# Patient Record
Sex: FEMALE | Race: WHITE | Employment: OTHER | URBAN - NONMETROPOLITAN AREA
[De-identification: names, ages, dates, MRNs, and addresses within clinical notes are randomized per-mention and may not be internally consistent; named-entity substitution may affect disease eponyms.]

---

## 2020-03-19 ENCOUNTER — APPOINTMENT (OUTPATIENT)
Dept: GENERAL RADIOLOGY | Age: 80
End: 2020-03-19
Payer: MEDICARE

## 2020-03-19 ENCOUNTER — HOSPITAL ENCOUNTER (EMERGENCY)
Age: 80
Discharge: HOME | End: 2020-03-19
Payer: MEDICARE

## 2020-03-19 VITALS
BODY MASS INDEX: 28.45 KG/M2 | RESPIRATION RATE: 18 BRPM | DIASTOLIC BLOOD PRESSURE: 75 MMHG | SYSTOLIC BLOOD PRESSURE: 153 MMHG | HEART RATE: 78 BPM | OXYGEN SATURATION: 100 % | WEIGHT: 177 LBS | TEMPERATURE: 98.1 F | HEIGHT: 66 IN

## 2020-03-19 LAB
ANION GAP SERPL CALCULATED.3IONS-SCNC: 13 MEQ/L (ref 8–16)
BASOPHILS # BLD: 0.3 %
BASOPHILS ABSOLUTE: 0 THOU/MM3 (ref 0–0.1)
BUN BLDV-MCNC: 22 MG/DL (ref 7–22)
CALCIUM SERPL-MCNC: 10.3 MG/DL (ref 8.5–10.5)
CHLORIDE BLD-SCNC: 99 MEQ/L (ref 98–111)
CO2: 26 MEQ/L (ref 23–33)
CREAT SERPL-MCNC: 0.8 MG/DL (ref 0.4–1.2)
EKG ATRIAL RATE: 98 BPM
EKG P AXIS: 69 DEGREES
EKG P-R INTERVAL: 148 MS
EKG Q-T INTERVAL: 326 MS
EKG QRS DURATION: 78 MS
EKG QTC CALCULATION (BAZETT): 416 MS
EKG R AXIS: 35 DEGREES
EKG T AXIS: 66 DEGREES
EKG VENTRICULAR RATE: 98 BPM
EOSINOPHIL # BLD: 2.3 %
EOSINOPHILS ABSOLUTE: 0.2 THOU/MM3 (ref 0–0.4)
ERYTHROCYTE [DISTWIDTH] IN BLOOD BY AUTOMATED COUNT: 13.2 % (ref 11.5–14.5)
ERYTHROCYTE [DISTWIDTH] IN BLOOD BY AUTOMATED COUNT: 45.3 FL (ref 35–45)
FLU A ANTIGEN: NEGATIVE
FLU B ANTIGEN: NEGATIVE
GFR SERPL CREATININE-BSD FRML MDRD: 69 ML/MIN/1.73M2
GLUCOSE BLD-MCNC: 97 MG/DL (ref 70–108)
GROUP A STREP CULTURE, REFLEX: NEGATIVE
HCT VFR BLD CALC: 41.7 % (ref 37–47)
HEMOGLOBIN: 13.2 GM/DL (ref 12–16)
IMMATURE GRANS (ABS): 0.02 THOU/MM3 (ref 0–0.07)
IMMATURE GRANULOCYTES: 0.2 %
LYMPHOCYTES # BLD: 19.5 %
LYMPHOCYTES ABSOLUTE: 1.8 THOU/MM3 (ref 1–4.8)
MCH RBC QN AUTO: 30 PG (ref 26–33)
MCHC RBC AUTO-ENTMCNC: 31.7 GM/DL (ref 32.2–35.5)
MCV RBC AUTO: 94.8 FL (ref 81–99)
MONOCYTES # BLD: 4.9 %
MONOCYTES ABSOLUTE: 0.4 THOU/MM3 (ref 0.4–1.3)
NUCLEATED RED BLOOD CELLS: 0 /100 WBC
OSMOLALITY CALCULATION: 278.9 MOSMOL/KG (ref 275–300)
PLATELET # BLD: 285 THOU/MM3 (ref 130–400)
PMV BLD AUTO: 9.9 FL (ref 9.4–12.4)
POTASSIUM SERPL-SCNC: 4.2 MEQ/L (ref 3.5–5.2)
RBC # BLD: 4.4 MILL/MM3 (ref 4.2–5.4)
REFLEX THROAT C + S: NORMAL
SEG NEUTROPHILS: 72.8 %
SEGMENTED NEUTROPHILS ABSOLUTE COUNT: 6.6 THOU/MM3 (ref 1.8–7.7)
SODIUM BLD-SCNC: 138 MEQ/L (ref 135–145)
TROPONIN T: < 0.01 NG/ML
WBC # BLD: 9.1 THOU/MM3 (ref 4.8–10.8)

## 2020-03-19 PROCEDURE — 93010 ELECTROCARDIOGRAM REPORT: CPT | Performed by: INTERNAL MEDICINE

## 2020-03-19 PROCEDURE — 80048 BASIC METABOLIC PNL TOTAL CA: CPT

## 2020-03-19 PROCEDURE — 93005 ELECTROCARDIOGRAM TRACING: CPT | Performed by: EMERGENCY MEDICINE

## 2020-03-19 PROCEDURE — 87804 INFLUENZA ASSAY W/OPTIC: CPT

## 2020-03-19 PROCEDURE — 85025 COMPLETE CBC W/AUTO DIFF WBC: CPT

## 2020-03-19 PROCEDURE — 87880 STREP A ASSAY W/OPTIC: CPT

## 2020-03-19 PROCEDURE — 36415 COLL VENOUS BLD VENIPUNCTURE: CPT

## 2020-03-19 PROCEDURE — 87070 CULTURE OTHR SPECIMN AEROBIC: CPT

## 2020-03-19 PROCEDURE — 99282 EMERGENCY DEPT VISIT SF MDM: CPT

## 2020-03-19 PROCEDURE — 71046 X-RAY EXAM CHEST 2 VIEWS: CPT

## 2020-03-19 PROCEDURE — 99283 EMERGENCY DEPT VISIT LOW MDM: CPT

## 2020-03-19 PROCEDURE — 84484 ASSAY OF TROPONIN QUANT: CPT

## 2020-03-19 RX ORDER — LISINOPRIL 20 MG/1
20 TABLET ORAL DAILY
COMMUNITY

## 2020-03-19 RX ORDER — TRIAMTERENE AND HYDROCHLOROTHIAZIDE 37.5; 25 MG/1; MG/1
1 TABLET ORAL DAILY
COMMUNITY

## 2020-03-19 RX ORDER — ROSUVASTATIN CALCIUM 10 MG/1
10 TABLET, COATED ORAL DAILY
COMMUNITY

## 2020-03-19 ASSESSMENT — ENCOUNTER SYMPTOMS
SHORTNESS OF BREATH: 1
COUGH: 1
RHINORRHEA: 0
VOMITING: 0
ABDOMINAL PAIN: 0
NAUSEA: 0
SORE THROAT: 1

## 2020-03-19 NOTE — ED NOTES
Pt resting in bed upon entering room requesting to use the bathroom. Pt provided with stand by assistance and returned to bed without incident. Upon returning to room pt appeared to be sob and spo2 initially was 88% however increased to 97% on room air. Pt waiting to discuss the POC with provider. Call light remains in reach will continue to monitor and update on the POC.      Kathy Corrales RN  03/19/20 3731

## 2020-03-19 NOTE — ED PROVIDER NOTES
[address] [facility] [address] [address] EMERGENCY DEPT    EMERGENCY MEDICINE     Pt Name: Silvestre Justice  MRN: [id number]  [place] 1940  Date of evaluation: 3/19/2020  Provider: Chirag Dunlap [facility]       Chief Complaint   Patient presents with    Cough    Shortness of Breath       HISTORY OF PRESENT ILLNESS    Silvestre Justice is a 78 y.o. female who presents to the emergency department from home with a past medical history of HTN and HLD. Patient states that three days ago she developed a nonproductive cough. Yesterday the patient was out running errands when she noticed worsened chest congestion. Today she reports shortness of breath but no cough. Patient went to an Urgent Care where she was told she had diminished breath sounds and sent to the ED. Patient rates her current symptoms as a 5/10 in severity. She notes sore throat, decreased hearing, and a headache in the frontal region. Patient has been using an albuterol inhaler for her symptoms that she usually uses when walking, with no relief. Patient repeatedly states that this is not a cardiac issue as she has experienced this previously and has had cardiac workups for this. Patient states that she sought evaluation today due to concerns from her daughter. Patient denies fever, chest pain, new rhinorrhea, or abdominal pain. No further complaints at initial encounter. Triage notes and Nursing notes were reviewed by myself. Any discrepancies are addressed above. PAST MEDICAL HISTORY     Past Medical History:   Diagnosis Date    Hyperlipidemia     Hypertension        SURGICAL HISTORY     History reviewed. No pertinent surgical history.     CURRENT MEDICATIONS       Discharge Medication List as of 3/19/2020  3:30 PM      CONTINUE these medications which have NOT CHANGED    Details   lisinopril (PRINIVIL;ZESTRIL) 20 MG tablet Take 20 mg by mouth dailyHistorical Med      rosuvastatin (CRESTOR) 10 MG tablet Take 10 mg by mouth daily Pt reports 6 tabs a monthHistorical Med      triamterene-hydrochlorothiazide (MAXZIDE-25) 37.5-25 MG per tablet Take 1 tablet by mouth dailyHistorical Med             ALLERGIES     Patient has no known allergies. FAMILY HISTORY     History reviewed. No pertinent family history. SOCIAL HISTORY       Social History     Socioeconomic History    Marital status: Single     Spouse name: None    Number of children: None    Years of education: None    Highest education level: None   Occupational History    None   Social Needs    Financial resource strain: None    Food insecurity     Worry: None     Inability: None    Transportation needs     Medical: None     Non-medical: None   Tobacco Use    Smoking status: Never Smoker    Smokeless tobacco: Never Used   Substance and Sexual Activity    Alcohol use: Yes    Drug use: Never    Sexual activity: None   Lifestyle    Physical activity     Days per week: None     Minutes per session: None    Stress: None   Relationships    Social connections     Talks on phone: None     Gets together: None     Attends Yazdanism service: None     Active member of club or organization: None     Attends meetings of clubs or organizations: None     Relationship status: None    Intimate partner violence     Fear of current or ex partner: None     Emotionally abused: None     Physically abused: None     Forced sexual activity: None   Other Topics Concern    None   Social History Narrative    None       REVIEW OF SYSTEMS     Review of Systems   Constitutional: Negative for fever. HENT: Positive for congestion, hearing loss (muffled) and sore throat. Negative for rhinorrhea. Respiratory: Positive for cough and shortness of breath. Cardiovascular: Negative for chest pain. Gastrointestinal: Negative for abdominal pain, nausea and vomiting. Musculoskeletal: Negative for arthralgias. Neurological: Positive for headaches.        Except as noted above the remainder of the review of systems No acute findings. **This report has been created using voice recognition software. It may contain minor errors which are inherent in voice recognition technology. **      Final report electronically signed by Dr. Eloisa Vásquez on 3/19/2020 2:25 PM          LABS:  Labs Reviewed   CBC WITH AUTO DIFFERENTIAL - Abnormal; Notable for the following components:       Result Value    MCHC 31.7 (*)     RDW-SD 45.3 (*)     All other components within normal limits   GLOMERULAR FILTRATION RATE, ESTIMATED - Abnormal; Notable for the following components:    Est, Glom Filt Rate 69 (*)     All other components within normal limits   RAPID INFLUENZA A/B ANTIGENS   CULTURE, THROAT    Narrative:     Source: Specimen not received       Site:           Current Antibiotics:   BASIC METABOLIC PANEL   TROPONIN   ANION GAP   OSMOLALITY   GROUP A STREP, REFLEX       All other labs were within normal range or not returned as of this dictation. Please note, any cultures that may have been sent were not resulted at the time of this patient visit. EMERGENCY DEPARTMENT COURSE andMedical Decision Making:     MDM/   This is a 28-year-old female with a past medical history of hyperlipidemia and hypertension who presented to the ED today with a gradual onset of cough, congestion, and shortness of breath. Patient was seen in urgent care and recommended to come to the ER due to the nature of her symptoms and concern for possible cardiopulmonary involvement. In the ED we ran an EKG which demonstrated no STEMI, and STEMI or notable abnormality. Patient's initial troponin was negative. A chest x-ray was also obtained which showed no abnormalities. Based on the patient's history, and physical exam, I do not believe this to be ACS, pneumonia, COVID-19, or pneumothorax. Labs were otherwise all within normal limits.  It is likely the patient is suffering from a viral URI that is contributing to her congestion, occasional cough, and mild

## 2020-03-19 NOTE — ED NOTES
Upon entering room for reassessment pt had removed herself from the monitor and was no longer present.      Luisana Mcfarlane RN  03/19/20 2225

## 2020-03-19 NOTE — ED TRIAGE NOTES
Pt to ed concerned with sob and cough that has progressed over the last 72 hours. Pt placed on the cardiac monitor and ekg obtained. Pt reports that prior to arrival she used her albuterol inhaler without relief. Attempted to start protocol orders and pt refuses until she is seen per provider. Lung sounds clear, regular and unlabored. Pt reports labored breathing with movement. Bowel sounds active x4 and pt denies tenderness. Call light inr each with sr up x2 will monitor for further orders.

## 2020-03-24 LAB — THROAT/NOSE CULTURE: NORMAL

## 2021-02-24 ENCOUNTER — OFFICE VISIT (OUTPATIENT)
Dept: FAMILY MEDICINE CLINIC | Age: 81
End: 2021-02-24
Payer: MEDICARE

## 2021-02-24 VITALS
RESPIRATION RATE: 18 BRPM | TEMPERATURE: 97.3 F | DIASTOLIC BLOOD PRESSURE: 72 MMHG | SYSTOLIC BLOOD PRESSURE: 132 MMHG | OXYGEN SATURATION: 98 % | HEART RATE: 72 BPM

## 2021-02-24 DIAGNOSIS — R33.9 URINARY RETENTION: ICD-10-CM

## 2021-02-24 DIAGNOSIS — R10.30 LOWER ABDOMINAL PAIN: ICD-10-CM

## 2021-02-24 DIAGNOSIS — R35.0 URINARY FREQUENCY: Primary | ICD-10-CM

## 2021-02-24 LAB
BILIRUBIN URINE: NEGATIVE
BLOOD URINE, POC: NEGATIVE
CHARACTER, URINE: CLEAR
COLOR, URINE: ABNORMAL
GLUCOSE URINE: NEGATIVE MG/DL
KETONES, URINE: NEGATIVE
LEUKOCYTE CLUMPS, URINE: NEGATIVE
NITRITE, URINE: NEGATIVE
PH, URINE: 5.5 (ref 5–9)
PROTEIN, URINE: NEGATIVE MG/DL
SPECIFIC GRAVITY, URINE: 1.02 (ref 1–1.03)
UROBILINOGEN, URINE: 0.2 EU/DL (ref 0–1)

## 2021-02-24 PROCEDURE — G8484 FLU IMMUNIZE NO ADMIN: HCPCS | Performed by: NURSE PRACTITIONER

## 2021-02-24 PROCEDURE — 1036F TOBACCO NON-USER: CPT | Performed by: NURSE PRACTITIONER

## 2021-02-24 PROCEDURE — 1123F ACP DISCUSS/DSCN MKR DOCD: CPT | Performed by: NURSE PRACTITIONER

## 2021-02-24 PROCEDURE — G8417 CALC BMI ABV UP PARAM F/U: HCPCS | Performed by: NURSE PRACTITIONER

## 2021-02-24 PROCEDURE — G8400 PT W/DXA NO RESULTS DOC: HCPCS | Performed by: NURSE PRACTITIONER

## 2021-02-24 PROCEDURE — G8427 DOCREV CUR MEDS BY ELIG CLIN: HCPCS | Performed by: NURSE PRACTITIONER

## 2021-02-24 PROCEDURE — 99203 OFFICE O/P NEW LOW 30 MIN: CPT | Performed by: NURSE PRACTITIONER

## 2021-02-24 PROCEDURE — 1090F PRES/ABSN URINE INCON ASSESS: CPT | Performed by: NURSE PRACTITIONER

## 2021-02-24 PROCEDURE — 4040F PNEUMOC VAC/ADMIN/RCVD: CPT | Performed by: NURSE PRACTITIONER

## 2021-02-24 RX ORDER — CIPROFLOXACIN 500 MG/1
500 TABLET, FILM COATED ORAL 2 TIMES DAILY
Qty: 6 TABLET | Refills: 0 | Status: SHIPPED | OUTPATIENT
Start: 2021-02-24 | End: 2021-02-27

## 2021-02-24 RX ORDER — PHENAZOPYRIDINE HYDROCHLORIDE 100 MG/1
100 TABLET, FILM COATED ORAL 3 TIMES DAILY PRN
Qty: 9 TABLET | Refills: 0 | Status: SHIPPED | OUTPATIENT
Start: 2021-02-24 | End: 2021-02-27

## 2021-02-24 ASSESSMENT — PATIENT HEALTH QUESTIONNAIRE - PHQ9
SUM OF ALL RESPONSES TO PHQ QUESTIONS 1-9: 0
2. FEELING DOWN, DEPRESSED OR HOPELESS: 0
1. LITTLE INTEREST OR PLEASURE IN DOING THINGS: 0

## 2021-02-24 NOTE — PATIENT INSTRUCTIONS
Patient Education        Urinary Retention: Care Instructions  Your Care Instructions     Urinary retention means that you aren't able to urinate. In men, it is often caused by a blockage of the urinary tract from an enlarged prostate gland. In men and women, it can also be caused by an infection or nerve damage. Or it may be a side effect of a medicine. The doctor may have drained the urine from your bladder. If you still have problems passing urine, you may need to use a catheter at home. This is used to empty your bladder until the problem can be fixed. Your doctor may put a catheter in your bladder before you go home. If so, he or she will tell you when to come back to have the catheter removed. The doctor has checked you closely. But problems can develop later. If you notice any problems or new symptoms, get medical treatment right away. Follow-up care is a key part of your treatment and safety. Be sure to make and go to all appointments, and call your doctor if you are having problems. It's also a good idea to know your test results and keep a list of the medicines you take. How can you care for yourself at home? · Take your medicines exactly as prescribed. Call your doctor if you think you are having a problem with your medicine. You will get more details on the specific medicines your doctor prescribes. · Check with your doctor before you use any over-the-counter medicines. Many cold and allergy medicines, for example, can make this problem worse. Make sure your doctor knows all of the medicines, vitamins, supplements, and herbal remedies you take. · Spread out through the day the amount of fluid you drink. Do not drink a lot at bedtime. · Avoid alcohol and caffeine. · If you have been given a catheter, or if one is already in place, follow the instructions you were given. Always wash your hands before and after you handle the catheter. When should you call for help? Call your doctor now or seek immediate medical care if:    · You cannot urinate at all, or it is getting harder to urinate.     · You have symptoms of a urinary tract infection. These may include:  ? Pain or burning when you urinate. ? A frequent need to urinate without being able to pass much urine. ? Pain in the flank, which is just below the rib cage and above the waist on either side of the back. ? Blood in your urine. ? A fever. Watch closely for changes in your health, and be sure to contact your doctor if:    · You have any problems with your catheter.     · You do not get better as expected. Where can you learn more? Go to https://QFO LabspeBitMethod.Ogone. org and sign in to your [employer] account. Enter M244 in the Metastorm box to learn more about \"Urinary Retention: Care Instructions. \"     If you do not have an account, please click on the \"Sign Up Now\" link. Current as of: June 29, 2020               Content Version: 12.6  © 2006-2020 [employer]. Care instructions adapted under license by [facility] ([facility]). If you have questions about a medical condition or this instruction, always ask your healthcare professional. [place] [address] any warranty or liability for your use of this information. Patient Education        Urinary Tract Infection in Women: Care Instructions  Your Care Instructions     A urinary tract infection, or UTI, is a general term for an infection anywhere between the kidneys and the urethra (where urine comes out). Most UTIs are bladder infections. They often cause pain or burning when you urinate. UTIs are caused by bacteria and can be cured with antibiotics. Be sure to complete your treatment so that the infection goes away. Follow-up care is a key part of your treatment and safety. Be sure to make and go to all appointments, and call your doctor if you are having problems. It's also a good idea to know your test results and keep a list of the medicines you take. How can you care for yourself at home? · Take your antibiotics as directed. Do not stop taking them just because you feel better. You need to take the full course of antibiotics. · Drink extra water and other fluids for the next day or two. This may help wash out the bacteria that are causing the infection. (If you have kidney, heart, or liver disease and have to limit fluids, talk with your doctor before you increase your fluid intake.)  · Avoid drinks that are carbonated or have caffeine. They can irritate the bladder. · Urinate often. Try to empty your bladder each time. · To relieve pain, take a hot bath or lay a heating pad set on low over your lower belly or genital area. Never go to sleep with a heating pad in place. To prevent UTIs  · Drink plenty of water each day. This helps you urinate often, which clears bacteria from your system. (If you have kidney, heart, or liver disease and have to limit fluids, talk with your doctor before you increase your fluid intake.)  · Urinate when you need to. · Urinate right after you have sex. · Change sanitary pads often. · Avoid douches, bubble baths, feminine hygiene sprays, and other feminine hygiene products that have deodorants. · After going to the bathroom, wipe from front to back. When should you call for help? Call your doctor now or seek immediate medical care if:    · Symptoms such as fever, chills, nausea, or vomiting get worse or appear for the first time.     · You have new pain in your back just below your rib cage. This is called flank pain.     · There is new blood or pus in your urine.     · You have any problems with your antibiotic medicine. Watch closely for changes in your health, and be sure to contact your doctor if:    · You are not getting better after taking an antibiotic for 2 days.     · Your symptoms go away but then come back. Where can you learn more? Go to https://chpelashandaeb.healthQuincy Bioscience. org and sign in to your [employer] account. Enter Y256 in the Nimbula box to learn more about \"Urinary Tract Infection in Women: Care Instructions. \"     If you do not have an account, please click on the \"Sign Up Now\" link. Current as of: June 29, 2020               Content Version: 12.6  © 8046-2416 [employer]. Care instructions adapted under license by [facility] ([facility]). If you have questions about a medical condition or this instruction, always ask your healthcare professional. [address] any warranty or liability for your use of this information.

## 2021-02-24 NOTE — PROGRESS NOTES
Ervin Benito (:  1940) is a [de-identified] y.o. female,New patient, here for evaluation of the following chief complaint(s):  Urinary Retention (ongoing off and on at least 1 week) and Abdominal Cramping (started last night)      ASSESSMENT/PLAN:  1. Urinary frequency  Pt non toxic appearing and in no acute distress. UA in office unremarkable, but with hx of UTI's and her current symptoms will treat presumptively, sending urine for culture also. -     ciprofloxacin (CIPRO) 500 MG tablet; Take 1 tablet by mouth 2 times daily for 3 days, Disp-6 tablet, R-0Normal  -     phenazopyridine (PYRIDIUM) 100 MG tablet; Take 1 tablet by mouth 3 times daily as needed for Pain, Disp-9 tablet, R-0Normal  2. Urinary retention  -     Culture, Urine  3. Lower abdominal pain  -     Culture, Urine  -     ciprofloxacin (CIPRO) 500 MG tablet; Take 1 tablet by mouth 2 times daily for 3 days, Disp-6 tablet, R-0Normal  -     phenazopyridine (PYRIDIUM) 100 MG tablet; Take 1 tablet by mouth 3 times daily as needed for Pain, Disp-9 tablet, R-0Normal      Return in about 1 week (around 3/3/2021). SUBJECTIVE/OBJECTIVE:  The past year has been \"horrible\" was found to have some respiratory issues. Was placed on Trelegy samples, 2/15 was the last time she took it. Noticed on 2/15 that her output was decreasing. Having lower abdominal cramping. No fever or chills. Going less and putting out less. Review of Systems   Constitutional: Negative for chills and fever. Gastrointestinal: Positive for abdominal pain (lower). Negative for nausea and vomiting. Genitourinary: Positive for difficulty urinating and urgency. Musculoskeletal: Negative for back pain. Physical Exam  Vitals signs and nursing note reviewed. Constitutional:       General: She is not in acute distress. Appearance: She is well-developed. She is not diaphoretic. HENT:      Head: Normocephalic and atraumatic. Right Ear: Tympanic membrane and external ear normal. Tympanic membrane is not injected or erythematous. Left Ear: Tympanic membrane and external ear normal. Tympanic membrane is not injected or erythematous. Nose: Nose normal.      Mouth/Throat:      Pharynx: Uvula midline. Eyes:      General:         Right eye: No discharge. Left eye: No discharge. Conjunctiva/sclera: Conjunctivae normal.      Pupils: Pupils are equal, round, and reactive to light. Neck:      Musculoskeletal: Full passive range of motion without pain, normal range of motion and neck supple. Thyroid: No thyromegaly. Trachea: Trachea normal.   Cardiovascular:      Rate and Rhythm: Normal rate and regular rhythm. Pulses: Normal pulses. Heart sounds: Normal heart sounds, S1 normal and S2 normal. No murmur. No friction rub. No gallop. Pulmonary:      Effort: Pulmonary effort is normal. No respiratory distress. Breath sounds: Normal breath sounds. No wheezing or rales. Chest:      Chest wall: No tenderness. Abdominal:      General: Abdomen is flat. Bowel sounds are normal. There is no distension. Palpations: Abdomen is soft. There is no mass. Tenderness: There is abdominal tenderness in the suprapubic area. There is no right CVA tenderness, left CVA tenderness or guarding. Musculoskeletal: Normal range of motion. General: No swelling, tenderness or deformity. Lymphadenopathy:      Cervical: No cervical adenopathy. Skin:     General: Skin is warm and dry. Capillary Refill: Capillary refill takes less than 2 seconds. Neurological:      General: No focal deficit present. Mental Status: She is alert and oriented to person, place, and time. Deep Tendon Reflexes: Reflexes are normal and symmetric. An electronic signature was used to authenticate this note.     --Diana Dodge, APRN - CNP

## 2021-02-26 LAB
ORGANISM: ABNORMAL
URINE CULTURE, ROUTINE: ABNORMAL

## 2021-03-01 ASSESSMENT — ENCOUNTER SYMPTOMS
VOMITING: 0
ABDOMINAL PAIN: 1
BACK PAIN: 0
NAUSEA: 0